# Patient Record
Sex: MALE | Race: WHITE
[De-identification: names, ages, dates, MRNs, and addresses within clinical notes are randomized per-mention and may not be internally consistent; named-entity substitution may affect disease eponyms.]

---

## 2019-03-26 ENCOUNTER — HOSPITAL ENCOUNTER (EMERGENCY)
Dept: HOSPITAL 94 - ER | Age: 24
LOS: 1 days | Discharge: PSYCHIATRIC OR SUBSTANCE USE FACILITY | End: 2019-03-27
Payer: COMMERCIAL

## 2019-03-26 VITALS — HEIGHT: 70 IN | BODY MASS INDEX: 23.67 KG/M2 | WEIGHT: 165.35 LBS
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
## 2019-03-26 DIAGNOSIS — Z56.0: ICD-10-CM

## 2019-03-26 DIAGNOSIS — T43.222A: ICD-10-CM

## 2019-03-26 DIAGNOSIS — T43.592A: ICD-10-CM

## 2019-03-26 DIAGNOSIS — F31.9: ICD-10-CM

## 2019-03-26 DIAGNOSIS — T14.91XA: Primary | ICD-10-CM

## 2019-03-26 LAB
ALBUMIN SERPL BCP-MCNC: 4.3 G/DL (ref 3.4–5)
ALBUMIN/GLOB SERPL: 1.1 {RATIO} (ref 1.1–1.5)
ALP SERPL-CCNC: 107 IU/L (ref 46–116)
ALT SERPL W P-5'-P-CCNC: 28 U/L (ref 12–78)
AMPHETAMINES UR QL SCN: NEGATIVE
ANION GAP SERPL CALCULATED.3IONS-SCNC: 10 MMOL/L (ref 8–16)
AST SERPL W P-5'-P-CCNC: 24 U/L (ref 10–37)
BACTERIA URNS QL MICRO: (no result) /HPF
BARBITURATES UR QL SCN: NEGATIVE
BASOPHILS # BLD AUTO: 0.1 X10'3 (ref 0–0.2)
BASOPHILS NFR BLD AUTO: 0.5 % (ref 0–1)
BENZODIAZ UR QL SCN: NEGATIVE
BILIRUB SERPL-MCNC: 1.1 MG/DL (ref 0.1–1)
BUN SERPL-MCNC: 22 MG/DL (ref 7–18)
BUN/CREAT SERPL: 19.8 (ref 5.4–32)
BZE UR QL SCN: NEGATIVE
CALCIUM SERPL-MCNC: 9.2 MG/DL (ref 8.5–10.1)
CANNABINOIDS UR QL SCN: POSITIVE
CHLORIDE SERPL-SCNC: 104 MMOL/L (ref 99–107)
CLARITY UR: CLEAR
CO2 SERPL-SCNC: 27.3 MMOL/L (ref 24–32)
COLOR UR: YELLOW
CREAT SERPL-MCNC: 1.11 MG/DL (ref 0.6–1.1)
DEPRECATED SQUAMOUS URNS QL MICRO: (no result) /LPF
EOSINOPHIL # BLD AUTO: 0.1 X10'3 (ref 0–0.9)
EOSINOPHIL NFR BLD AUTO: 0.7 % (ref 0–6)
ERYTHROCYTE [DISTWIDTH] IN BLOOD BY AUTOMATED COUNT: 14 % (ref 11.5–14.5)
ETHANOL SERPL-MCNC: < 0.01 GM/DL (ref 0–0.01)
GFR SERPL CREATININE-BSD FRML MDRD: 81 ML/MIN
GLUCOSE SERPL-MCNC: 69 MG/DL (ref 70–104)
GLUCOSE UR STRIP-MCNC: NEGATIVE MG/DL
HCT VFR BLD AUTO: 42.1 % (ref 42–52)
HGB BLD-MCNC: 14.2 G/DL (ref 14–17.9)
HGB UR QL STRIP: NEGATIVE
KETONES UR STRIP-MCNC: 40 MG/DL
LEUKOCYTE ESTERASE UR QL STRIP: NEGATIVE
LYMPHOCYTES # BLD AUTO: 1.7 X10'3 (ref 1.1–4.8)
LYMPHOCYTES NFR BLD AUTO: 16.7 % (ref 21–51)
MCH RBC QN AUTO: 29.6 PG (ref 27–31)
MCHC RBC AUTO-ENTMCNC: 33.6 G/DL (ref 33–36.5)
MCV RBC AUTO: 88 FL (ref 78–98)
METHADONE UR QL SCN: NEGATIVE
MONOCYTES # BLD AUTO: 0.6 X10'3 (ref 0–0.9)
MONOCYTES NFR BLD AUTO: 5.9 % (ref 2–12)
NEUTROPHILS # BLD AUTO: 7.6 X10'3 (ref 1.8–7.7)
NEUTROPHILS NFR BLD AUTO: 76.2 % (ref 42–75)
NITRITE UR QL STRIP: NEGATIVE
OPIATES UR QL SCN: NEGATIVE
PCP UR QL SCN: NEGATIVE
PH UR STRIP: 6 [PH] (ref 4.8–8)
PLATELET # BLD AUTO: 207 X10'3 (ref 140–440)
PMV BLD AUTO: 8.7 FL (ref 7.4–10.4)
POTASSIUM SERPL-SCNC: 4.2 MMOL/L (ref 3.5–5.1)
PROT SERPL-MCNC: 8.2 G/DL (ref 6.4–8.2)
PROT UR QL STRIP: (no result) MG/DL
RBC # BLD AUTO: 4.79 X10'6 (ref 4.7–6.1)
RBC #/AREA URNS HPF: (no result) /HPF (ref 0–2)
SODIUM SERPL-SCNC: 141 MMOL/L (ref 135–145)
SP GR UR STRIP: >=1.03 (ref 1–1.03)
TRANS CELLS URNS QL MICRO: (no result) /HPF
URN COLLECT METHOD CLASS: (no result)
UROBILINOGEN UR STRIP-MCNC: 0.2 E.U/DL (ref 0.2–1)
WBC # BLD AUTO: 9.9 X10'3 (ref 4.5–11)
WBC #/AREA URNS HPF: (no result) /HPF (ref 0–4)

## 2019-03-26 PROCEDURE — 85025 COMPLETE CBC W/AUTO DIFF WBC: CPT

## 2019-03-26 PROCEDURE — 99285 EMERGENCY DEPT VISIT HI MDM: CPT

## 2019-03-26 PROCEDURE — 81001 URINALYSIS AUTO W/SCOPE: CPT

## 2019-03-26 PROCEDURE — 80053 COMPREHEN METABOLIC PANEL: CPT

## 2019-03-26 PROCEDURE — 84443 ASSAY THYROID STIM HORMONE: CPT

## 2019-03-26 PROCEDURE — 80305 DRUG TEST PRSMV DIR OPT OBS: CPT

## 2019-03-26 PROCEDURE — 36415 COLL VENOUS BLD VENIPUNCTURE: CPT

## 2019-03-26 PROCEDURE — 80320 DRUG SCREEN QUANTALCOHOLS: CPT

## 2019-03-26 SDOH — ECONOMIC STABILITY - INCOME SECURITY: UNEMPLOYMENT, UNSPECIFIED: Z56.0

## 2019-03-26 NOTE — NUR
The patient was transferred to bed 22. He was made aware of unit routine and 
rules. He appears depressed and despondant. Stated he has been feeling paranoid 
that he is being watched and followed. He believes there are cameras in his 
home. He believes his food is being tampered with at times. Reports "seeing and 
hearing signs" Reports a voice saying "get out. go" He also reports getting 
messages from the TV. He reports a family history of schizophrenia in his 
paternal grandmother and his mother has bipolor disorder.

## 2019-03-26 NOTE — NUR
Discussed telepsych report with Jimmy CELESTIN and Zyprexa 5 mg po given to the 
patient. He is quiet and guarded but has been cooperative.

## 2019-03-27 ENCOUNTER — HOSPITAL ENCOUNTER (INPATIENT)
Dept: HOSPITAL 94 - ADULT MH | Age: 24
LOS: 3 days | Discharge: HOME | DRG: 885 | End: 2019-03-30
Attending: PSYCHIATRY & NEUROLOGY | Admitting: PSYCHIATRY & NEUROLOGY
Payer: SELF-PAY

## 2019-03-27 VITALS — BODY MASS INDEX: 22.19 KG/M2 | WEIGHT: 154.98 LBS | HEIGHT: 70 IN

## 2019-03-27 VITALS — SYSTOLIC BLOOD PRESSURE: 142 MMHG | DIASTOLIC BLOOD PRESSURE: 65 MMHG

## 2019-03-27 VITALS — DIASTOLIC BLOOD PRESSURE: 68 MMHG | SYSTOLIC BLOOD PRESSURE: 137 MMHG

## 2019-03-27 DIAGNOSIS — F29: ICD-10-CM

## 2019-03-27 DIAGNOSIS — Z79.899: ICD-10-CM

## 2019-03-27 DIAGNOSIS — G47.9: ICD-10-CM

## 2019-03-27 DIAGNOSIS — F25.9: ICD-10-CM
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
## 2019-03-27 DIAGNOSIS — T43.592A: ICD-10-CM

## 2019-03-27 DIAGNOSIS — F17.210: ICD-10-CM

## 2019-03-27 DIAGNOSIS — F33.2: Primary | ICD-10-CM

## 2019-03-27 DIAGNOSIS — T43.222A: ICD-10-CM

## 2019-03-27 DIAGNOSIS — F41.1: ICD-10-CM

## 2019-03-27 DIAGNOSIS — F12.19: ICD-10-CM

## 2019-03-27 DIAGNOSIS — F16.11: ICD-10-CM

## 2019-03-27 LAB
CHOLEST SERPL-MCNC: 167 MG/DL (ref 0–200)
CHOLEST/HDLC SERPL: 2.2 {RATIO} (ref 0–4.99)
HBA1C MFR BLD: 5.7 % (ref 4.5–6.2)
HDLC SERPL-MCNC: 76 MG/DL (ref 35–60)
LDLC SERPL DIRECT ASSAY-MCNC: 82 MG/DL (ref 50–100)
TRIGL SERPL-MCNC: 34 MG/DL (ref 20–135)

## 2019-03-27 PROCEDURE — 36415 COLL VENOUS BLD VENIPUNCTURE: CPT

## 2019-03-27 PROCEDURE — 87070 CULTURE OTHR SPECIMN AEROBIC: CPT

## 2019-03-27 PROCEDURE — 83036 HEMOGLOBIN GLYCOSYLATED A1C: CPT

## 2019-03-27 PROCEDURE — 80061 LIPID PANEL: CPT

## 2019-03-27 PROCEDURE — 84443 ASSAY THYROID STIM HORMONE: CPT

## 2019-03-27 RX ADMIN — NICOTINE SCH PATCH: 21 PATCH, EXTENDED RELEASE TRANSDERMAL at 18:36

## 2019-03-27 RX ADMIN — NICOTINE POLACRILEX PRN LOZ: 2 LOZENGE ORAL at 19:59

## 2019-03-27 NOTE — NUR
Admit Note: 1445



Pt presented to ER after ingesting 40-50 pills of sertraline and Vistaril depressed and 
quiet he was unable to contract for safety. History of bipolar and questionable 
schizophrenia. Pt has been seen by Dr. Cedeño in the community and was seen at [facility]. 



Pt brought to the unit upset expecting to be discharged and go home. Pt required security 
and therapeutic interventions for approximately an hour before agreeing to take oral 
medicine. 



Property inventoried with corbin counted by GERMÁN Dorman and GERMÁN Ladd. Pt. placed in rm #[id number] 
after taking a shower. Pt later apologized to staff for his behavior.

## 2019-03-28 VITALS — DIASTOLIC BLOOD PRESSURE: 59 MMHG | SYSTOLIC BLOOD PRESSURE: 119 MMHG

## 2019-03-28 VITALS — DIASTOLIC BLOOD PRESSURE: 86 MMHG | SYSTOLIC BLOOD PRESSURE: 140 MMHG

## 2019-03-28 RX ADMIN — NICOTINE SCH PATCH: 21 PATCH, EXTENDED RELEASE TRANSDERMAL at 08:27

## 2019-03-28 RX ADMIN — NICOTINE POLACRILEX PRN LOZ: 2 LOZENGE ORAL at 13:40

## 2019-03-28 RX ADMIN — NICOTINE POLACRILEX PRN LOZ: 2 LOZENGE ORAL at 21:11

## 2019-03-28 NOTE — NUR
RN Progress Note:



Legal hold: 5150



Client on involuntary status for DTS



Report received from TIFFANIE Barros with use of SBAR



Why are they here: Pt presented to ER after ingesting 40-50 pills of sertraline and Vistaril 
depressed and quiet he was unable to contract for safety. History of bipolar and 
questionable schizophrenia. Pt has been seen by Dr. Cedeño in the community and was seen at 
[facility]. 



Assessment



What happened this shift: 

The patient was asleep at change of shift. He got up for breakfast and was medication 
compliant. he attended all groups and was seen by Dr. Sweet.  The patient admits to feeling 
depressed but denies anxiety today. He states he is happy to be here and getting the help he 
needs.  He is having AH's however, not as "bad as in the past."  States he hears a sentence 
with some words louder than others. And now, at lunchtime he is hearing no voices at all. He 
states what is bothering him are "the cameras in my house, and that is a real big problem, I 
know they are real and there."  Reports he likes living with his sister as she "takes good 
care of me and helps me."  The patient in calm, cooperative and polite with staff today. He 
denies suicidal thoughts.



S/I, H/I: Denies

A/VH: AH's

Sleep: Napped

ADL's: Independent.

Group attendance: yes x 2

Were meds taken: Yes

Any med S/E: None noted 



Mental Status Exam

Appearance: Neat, clean

Eye contact: Good

Behavior: Interacted with staff and patients appropriately

Speech: Normal rate, volume, and rhythm

Mood: Depressed

Affect: Calm

Thought process: Linear

Thought Content: paranoid delusional (cameras in home)

Cognition: A/Ox 4

Insight: Poor

Judgment: Poor



Interventions

PRN's used: Norbert Nataly



Therapeutic interventions: 1:1 assessment with patient, provided active listening, 
maintained a safe and therapeutic environment to help establish rapport. Provided positive 
reinforcement. Medication administration and education. Maintained Q 15 minute checks for 
safety.

  

Justification of Continued Inpatient. Pt has poor judgment and is a danger to himself, he 
requires medication management and a therapeutic milieu to interrupt current crisis. Without 
intervention he is at risk for readmission.

## 2019-03-28 NOTE — NUR
RN Progress Note:



Legal hold: 5150



Client on involuntary status for competency.



Report received from TIFFANIE Ivy with use of SBAR



Why are they here: Pt presented to ER after ingesting 40-50 pills of sertraline and Vistaril 
depressed and quiet he was unable to contract for safety. History of bipolar and 
questionable schizophrenia. Pt has been seen by Dr. Cedeño in the community and was seen at 
[facility]. 



Assessment

Patient was up in the unit at the change of shift. He was witnessed interacting with other 
patients and watching TV. Met patient in his room for a 1:1 assessment. He denied current 
SI/HI. He stated he was calm and that he was upset earlier because he did not realize he was 
going to have to stay on the unit. He explained to staff that when he gets upset that his 
number one way to release agitation is physical activity. He expressed that he likes to work 
out and keep healthy. He was cooperative to finish his Crisis Intervention Plan and his MRSA 
swab. No violence or aggression noted this shift. He was able to approach staff this shift 
and request medications when he started getting anxious/agitated. 



S/I, H/I: Denies

A/VH: Denies

Sleep: Currently sleeping, see sleep assessment

ADL's: Independent.

Group attendance: None this shift

Were meds taken: Yes

Any med S/E: None noted 



Mental Status Exam

Appearance: Neat, clean

Eye contact: Good

Behavior: Interacted with staff and patients appropriately

Speech: Normal rate, volume, and rhythm

Mood: Calm

Affect: Congruent to mood

Thought process: Linear

Thought Content: Expressed his frustration, then explained he was okay now. 

Cognition: A/Ox 4

Insight: Fair

Judgment: Poor



Interventions

PRN's used: Ativan, Haldol, Benadryl



Therapeutic interventions: 1:1 assessment with patient, provided active listening, 
maintained a safe and therapeutic environment to help establish rapport. Provided positive 
reinforcement. Medication administration and education. Maintained Q 15 minute checks for 
safety.

  

Justification of Continued Inpatient. Pt has poor judgment and is a danger to himself, he 
requires medication management and a therapeutic milieu to interrupt current crisis. Without 
intervention he is at risk for readmission.

## 2019-03-28 NOTE — NUR
Malnutrition consult: Pt currently on a regular diet with documented PO intake 100% meeting 
nutrient needs. Unable to obtain wt hx d/t pt with no previous documented visits. Per 
physical assessment pt agitated and anxious. No H&P at this time. Pt with no documented 
edema or decrease in muscle strength. Pt currently does not meet criteria for malnutrition. 
Will continue to follow and monitor qualifying criteria for malnutrition.

-------------------------------------------------------------------------------

Addendum: 03/28/19 at 1017 by Marcela Diego RD

-------------------------------------------------------------------------------

Amended: Links added. Pt called EMS for [facility] states he has asthma.

## 2019-03-29 VITALS — DIASTOLIC BLOOD PRESSURE: 86 MMHG | SYSTOLIC BLOOD PRESSURE: 111 MMHG

## 2019-03-29 VITALS — DIASTOLIC BLOOD PRESSURE: 77 MMHG | SYSTOLIC BLOOD PRESSURE: 141 MMHG

## 2019-03-29 RX ADMIN — NICOTINE POLACRILEX PRN LOZ: 2 LOZENGE ORAL at 17:24

## 2019-03-29 RX ADMIN — VENLAFAXINE HYDROCHLORIDE SCH MG: 75 CAPSULE, EXTENDED RELEASE ORAL at 07:48

## 2019-03-29 RX ADMIN — NICOTINE SCH PATCH: 21 PATCH, EXTENDED RELEASE TRANSDERMAL at 07:48

## 2019-03-29 NOTE — NUR
Progress Note:



Legal hold: 5150



Client on involuntary status for DTS



Report received from TIFFANIE Barros with use of SBAR



Why are they here: Pt presented to ER after ingesting 40-50 pills of sertraline and Vistaril 
depressed and quiet he was unable to contract for safety. History of bipolar and 
questionable schizophrenia. Pt has been seen by Dr. Cedeño in the community and was seen at 
[facility]. 



Assessment



What happened this shift: 

The patient was asleep at change of shift. Reported sleeping well with a dose of Prazosin. 
Denies hallucinations, positive for ideas of reference (cameras in his home). Medication 
compliant and doing well on unit, interacts well with others, goes to all groups. Naps at 
times. Denies suicidal thoughts.  



S/I, H/I: Denies

A/VH: AH, denies hearing any this shift

Sleep: Naps

ADL's: Independent.

Group attendance: yes

Were meds taken: Yes

Any med S/E: None noted 



Mental Status Exam

Appearance: Neat, clean

Eye contact: Good

Behavior: Interacted with staff and patients appropriately

Speech: Normal rate, volume, and rhythm

Mood: Depressed

Affect: Calm

Thought process: Linear

Thought Content: medications and getting helped

Cognition: A/Ox 4

Insight: Poor

Judgment: Poor



Interventions

PRN's used: None



Therapeutic interventions: 1:1 assessment with patient, provided active listening, 
maintained a safe and therapeutic environment to help establish rapport. Provided positive 
reinforcement. Medication administration and education. Maintained Q 15 minute checks for 
safety.

  

Justification of Continued Inpatient. Pt has poor judgment and is a danger to himself, he 
requires medication management and a therapeutic milieu to interrupt current crisis. Without 
intervention he is at risk for readmission.

## 2019-03-29 NOTE — NUR
RN Progress Note:



Legal hold: 5150



Client on involuntary status for DTS



Report received from TIFFANIE Richmond with use of SBAR



Why are they here: Pt presented to ER after ingesting 40-50 pills of sertraline and Vistaril 
depressed and quiet he was unable to contract for safety. History of bipolar and 
questionable schizophrenia. Pt has been seen by Dr. Cedeño in the community and was seen at 
[facility]. 



Assessment



What happened this shift: Patient is up in the unit this evening enjoying unit milieu and 
interacting with other patients. He spent his time this evening in the group room playing 
card games with other patients. Hes cooperative for a 1:1 assessment at his bedside. He 
denies SI/HI, and states he is currently not having any AH. He does voice his concern about 
getting sleep, and requests that he get a sleeping medication to insure he gets rest this 
evening. Compliant with all evening medications. Turns to bed after taking HS meds. 



S/I, H/I: Denies

A/VH: AH, denies hearing any this shift

Sleep: Currently sleeping, see sleep assessment

ADL's: Independent.

Group attendance: No groups this shift

Were meds taken: Yes

Any med S/E: None noted 



Mental Status Exam

Appearance: Neat, clean

Eye contact: Good

Behavior: Interacted with staff and patients appropriately

Speech: Normal rate, volume, and rhythm

Mood: Depressed

Affect: Calm

Thought process: Linear

Thought Content: Worried about getting sleep, sleeping medications

Cognition: A/Ox 4

Insight: Poor

Judgment: Poor



Interventions

PRN's used: Nicotine Lozenge



Therapeutic interventions: 1:1 assessment with patient, provided active listening, 
maintained a safe and therapeutic environment to help establish rapport. Provided positive 
reinforcement. Medication administration and education. Maintained Q 15 minute checks for 
safety.

  

Justification of Continued Inpatient. Pt has poor judgment and is a danger to himself, he 
requires medication management and a therapeutic milieu to interrupt current crisis. Without 
intervention he is at risk for readmission.

## 2019-03-30 VITALS — SYSTOLIC BLOOD PRESSURE: 134 MMHG | DIASTOLIC BLOOD PRESSURE: 70 MMHG

## 2019-03-30 VITALS — SYSTOLIC BLOOD PRESSURE: 171 MMHG | DIASTOLIC BLOOD PRESSURE: 103 MMHG

## 2019-03-30 RX ADMIN — NICOTINE POLACRILEX PRN LOZ: 2 LOZENGE ORAL at 13:21

## 2019-03-30 RX ADMIN — NICOTINE SCH PATCH: 21 PATCH, EXTENDED RELEASE TRANSDERMAL at 07:48

## 2019-03-30 RX ADMIN — VENLAFAXINE HYDROCHLORIDE SCH MG: 75 CAPSULE, EXTENDED RELEASE ORAL at 07:49

## 2019-03-30 NOTE — NUR
Progress Note:



Legal hold: 5150



Client on involuntary status for DTS



Report received from TIFFANIE Richmond with use of SBAR



Why are they here: Pt presented to ER after ingesting 40-50 pills of sertraline and Vistaril 
depressed and quiet he was unable to contract for safety. History of bipolar and 
questionable schizophrenia. Pt has been seen by Dr. Cedeño in the community and was seen at 
[facility]. 



Assessment



What happened this shift: 

Patient is laying in bed at the johnson of shift. His mother comes and visits him this evening 
and stays for all of visiting hours. Met with patient for a 1:1 assessment. He says his 
visit is good and that him and his Mom have an "okay" relationship "but we are working on 
it." He denies SI/HI, VH/AH and makes no paranoid or delusional statements this shift. He is 
noted to interact well with other patients. He takes all medications as ordered. 



S/I, H/I: Denies

A/VH: AH, denies hearing any this shift

Sleep: Currently sleeping, see sleep assessment

ADL's: Independent.

Group attendance: Yes

Were meds taken: Yes

Any med S/E: None noted 



Mental Status Exam

Appearance: Neat, clean

Eye contact: Good

Behavior: Interacted with staff and patients appropriately

Speech: Normal rate, volume, and rhythm

Mood: Depressed

Affect: Calm

Thought process: Linear

Thought Content: Medications and getting helped

Cognition: A/Ox 4

Insight: Poor

Judgment: Poor



Interventions

PRN's used: None



Therapeutic interventions: 1:1 assessment with patient, provided active listening, 
maintained a safe and therapeutic environment to help establish rapport. Provided positive 
reinforcement. Monitored for self harm risk. Medication administration and education. 
Maintained Q 15 minute checks for safety.

  

Justification of Continued Inpatient. Pt has poor judgment and is a danger to himself, he 
requires medication management and a therapeutic milieu to interrupt current crisis. Without 
intervention he is at risk for readmission.

## 2019-03-30 NOTE — NUR
DC NOTE:



The patient exited the unit at 2005. He ambulated, accompanied by VIC Lama. The patient 
stated he would walk home because he lives real [place] on [facility]. He had all his 
valuables that were inventoried, and his mood and affect were bright. There was no emotional 
or physical distress, and the patient believes all he had to do was get off marijuana. He 
has a follow up appointment at [facility].  He did not want to quit smoking.

## 2019-03-30 NOTE — NUR
NURSING PROGRESS NOTE:



Legal hold: None



Client on Voluntary status



Report received from TIFFANIE Barros with use of SBAR



Why are they here: Pt presented to ER after ingesting 40-50 pills of sertraline and Vistaril 
depressed and quiet he was unable to contract for safety. History of bipolar and 
questionable schizophrenia. Pt has been seen by Dr. Cedeño in the community and was seen at 
[facility]. 



Assessment



What happened this shift: 

The patient was asleep at change of shift. Reported sleeping well. Up to all meals and 
groups with others. Participating well in groups and interacting with peers. Eating well. 
Denies suicidal thoughts and denies having any paranoid thoughts about cameras in his home. 
States, "I think some of those paranoid thoughts were coming from the pot use." Patient 
stated his head feels clear, and the group work is challenging him to "see himself."  No 
anxiety noted and went outside with others 3 times today. Mood is upbeat. Has spent time 
reading today.



S/I, H/I: Denies

A/VH: AH, Denies

Sleep: None

ADL's: Independent.

Group attendance: yes

Were meds taken: Yes

Any med S/E: None noted 



Mental Status Exam



Appearance: Neat, clean

Eye contact: Good

Behavior: Interacted with staff and patients appropriately

Speech: Normal rate, volume, and rhythm

Mood: Positive

Affect: Calm

Thought process: Linear

Thought Content: medications and getting helped

Cognition: A/Ox 4

Insight: Good

Judgment: Good



Interventions

PRN's used: None



Therapeutic interventions: 1:1 assessment with patient, provided active listening, 
maintained a safe and therapeutic environment to help establish rapport. Provided positive 
reinforcement. Medication administration and education. Maintained Q 15 minute checks for 
safety.

  

Justification of Continued Inpatient. Pt has poor judgment and is a danger to himself, he 
requires medication management and a therapeutic milieu to interrupt current crisis. Without 
intervention he is at risk for readmission.

## 2019-05-30 ENCOUNTER — HOSPITAL ENCOUNTER (EMERGENCY)
Dept: HOSPITAL 94 - ER | Age: 24
LOS: 3 days | Discharge: PSYCHIATRIC OR SUBSTANCE USE FACILITY | End: 2019-06-02
Payer: COMMERCIAL

## 2019-05-30 VITALS — WEIGHT: 116.51 LBS | HEIGHT: 70 IN | BODY MASS INDEX: 16.68 KG/M2

## 2019-05-30 DIAGNOSIS — R45.851: ICD-10-CM

## 2019-05-30 DIAGNOSIS — F31.9: ICD-10-CM

## 2019-05-30 DIAGNOSIS — Z56.0: ICD-10-CM

## 2019-05-30 DIAGNOSIS — Z79.899: ICD-10-CM

## 2019-05-30 DIAGNOSIS — F20.9: Primary | ICD-10-CM

## 2019-05-30 LAB
ALBUMIN SERPL BCP-MCNC: 4.4 G/DL (ref 3.4–5)
ALBUMIN/GLOB SERPL: 1.2 {RATIO} (ref 1.1–1.5)
ALP SERPL-CCNC: 110 IU/L (ref 46–116)
ALT SERPL W P-5'-P-CCNC: 29 U/L (ref 12–78)
ANION GAP SERPL CALCULATED.3IONS-SCNC: 10 MMOL/L (ref 8–16)
AST SERPL W P-5'-P-CCNC: 24 U/L (ref 10–37)
BASOPHILS # BLD AUTO: 0.1 X10'3 (ref 0–0.2)
BASOPHILS NFR BLD AUTO: 0.7 % (ref 0–1)
BILIRUB SERPL-MCNC: 0.9 MG/DL (ref 0.1–1)
BUN SERPL-MCNC: 15 MG/DL (ref 7–18)
BUN/CREAT SERPL: 14.7 (ref 5.4–32)
CALCIUM SERPL-MCNC: 8.8 MG/DL (ref 8.5–10.1)
CHLORIDE SERPL-SCNC: 103 MMOL/L (ref 99–107)
CO2 SERPL-SCNC: 26.6 MMOL/L (ref 24–32)
CREAT SERPL-MCNC: 1.02 MG/DL (ref 0.6–1.1)
EOSINOPHIL # BLD AUTO: 0.3 X10'3 (ref 0–0.9)
EOSINOPHIL NFR BLD AUTO: 2.9 % (ref 0–6)
ERYTHROCYTE [DISTWIDTH] IN BLOOD BY AUTOMATED COUNT: 13.1 % (ref 11.5–14.5)
ETHANOL SERPL-MCNC: < 0.01 GM/DL (ref 0–0.01)
GFR SERPL CREATININE-BSD FRML MDRD: 90 ML/MIN
GLUCOSE SERPL-MCNC: 93 MG/DL (ref 70–104)
HCT VFR BLD AUTO: 41.9 % (ref 42–52)
HGB BLD-MCNC: 14.3 G/DL (ref 14–17.9)
LYMPHOCYTES # BLD AUTO: 2.2 X10'3 (ref 1.1–4.8)
LYMPHOCYTES NFR BLD AUTO: 22.3 % (ref 21–51)
MCH RBC QN AUTO: 30.6 PG (ref 27–31)
MCHC RBC AUTO-ENTMCNC: 34.1 G/DL (ref 33–36.5)
MCV RBC AUTO: 89.8 FL (ref 78–98)
MONOCYTES # BLD AUTO: 0.6 X10'3 (ref 0–0.9)
MONOCYTES NFR BLD AUTO: 6.1 % (ref 2–12)
NEUTROPHILS # BLD AUTO: 6.7 X10'3 (ref 1.8–7.7)
NEUTROPHILS NFR BLD AUTO: 68 % (ref 42–75)
PLATELET # BLD AUTO: 201 X10'3 (ref 140–440)
PMV BLD AUTO: 9.2 FL (ref 7.4–10.4)
POTASSIUM SERPL-SCNC: 3.2 MMOL/L (ref 3.5–5.1)
PROT SERPL-MCNC: 8.1 G/DL (ref 6.4–8.2)
RBC # BLD AUTO: 4.67 X10'6 (ref 4.7–6.1)
SODIUM SERPL-SCNC: 140 MMOL/L (ref 135–145)
WBC # BLD AUTO: 9.9 X10'3 (ref 4.5–11)

## 2019-05-30 PROCEDURE — 80305 DRUG TEST PRSMV DIR OPT OBS: CPT

## 2019-05-30 PROCEDURE — 99285 EMERGENCY DEPT VISIT HI MDM: CPT

## 2019-05-30 PROCEDURE — 84443 ASSAY THYROID STIM HORMONE: CPT

## 2019-05-30 PROCEDURE — 85025 COMPLETE CBC W/AUTO DIFF WBC: CPT

## 2019-05-30 PROCEDURE — 80053 COMPREHEN METABOLIC PANEL: CPT

## 2019-05-30 PROCEDURE — 81001 URINALYSIS AUTO W/SCOPE: CPT

## 2019-05-30 PROCEDURE — 87088 URINE BACTERIA CULTURE: CPT

## 2019-05-30 PROCEDURE — 80320 DRUG SCREEN QUANTALCOHOLS: CPT

## 2019-05-30 PROCEDURE — 36415 COLL VENOUS BLD VENIPUNCTURE: CPT

## 2019-05-30 SDOH — ECONOMIC STABILITY - INCOME SECURITY: UNEMPLOYMENT, UNSPECIFIED: Z56.0

## 2019-05-30 NOTE — NUR
Pt was BIB sister. She reports that Jad has been off of his medications and 
has a history of bipolar, schizophrenia, and depression. On assessment pt 
states he is currently hearing voices that tell him to harm himself, but he 
will not give specifics. When asked if he is suicidal he states, "always" but 
will not answer anything further. He is quiet and cooperative, and lays still 
with his eyes closed.

## 2019-05-30 NOTE — NUR
Unable to obtain a current medication list from patient or his sister. Will 
call his pharmacy in the AM.

## 2019-05-31 LAB
AMPHETAMINES UR QL SCN: NEGATIVE
BARBITURATES UR QL SCN: NEGATIVE
BENZODIAZ UR QL SCN: NEGATIVE
BZE UR QL SCN: NEGATIVE
CANNABINOIDS UR QL SCN: POSITIVE
METHADONE UR QL SCN: NEGATIVE
OPIATES UR QL SCN: NEGATIVE
PCP UR QL SCN: NEGATIVE

## 2019-05-31 NOTE — NUR
pT STATES HE DOES NOT TAKE ANY HOME MEDS.  tHE LAST TIME HE TOOK MEDS WAS WHEN 
HE WAS HERE AT [facility] AS AN INPATIENT.

-------------------------------------------------------------------------------

Addendum: 05/31/19 at 0902 by [email or web address]

-------------------------------------------------------------------------------

WILL CHECK IF ABLE TO GET MED REC FROM [facility].

## 2019-05-31 NOTE — NUR
Pt requested food, a sandwhich, water, and juice were given to him, which he 
ate and drank. Pt went to sleep shortly after eating.

## 2019-05-31 NOTE — NUR
PATIENT IS SMILING AND STATES THAT HE "FEELS FINE NOW, BUT REALLY DOWN 
SOMETIMES""YULIANA BEEN OFF MY MEDS SINCE I WAS LAST HERE"

PATIENT APPROPRIATE, POLITE AMBULATES WNL

## 2019-05-31 NOTE — NUR
Pt awake after vitals being taken. Pt encouraged to try to urinate in specimen 
cup, pt verbalized understanding and is currently drinking water pitcher.

## 2019-06-01 LAB
BACTERIA URNS QL MICRO: (no result) /HPF
CLARITY UR: CLEAR
COLOR UR: YELLOW
DEPRECATED SQUAMOUS URNS QL MICRO: (no result) /LPF
GLUCOSE UR STRIP-MCNC: NEGATIVE MG/DL
HGB UR QL STRIP: NEGATIVE
KETONES UR STRIP-MCNC: NEGATIVE MG/DL
LEUKOCYTE ESTERASE UR QL STRIP: (no result)
MUCOUS THREADS URNS QL MICRO: (no result) /LPF
NITRITE UR QL STRIP: NEGATIVE
PH UR STRIP: 6 [PH] (ref 4.8–8)
PROT UR QL STRIP: NEGATIVE MG/DL
RBC #/AREA URNS HPF: (no result) /HPF (ref 0–2)
SP GR UR STRIP: 1.01 (ref 1–1.03)
URN COLLECT METHOD CLASS: (no result)
UROBILINOGEN UR STRIP-MCNC: 0.2 E.U/DL (ref 0.2–1)
WBC #/AREA URNS HPF: (no result) /HPF (ref 0–4)

## 2019-06-01 NOTE — NUR
The patient is pacing back and forth directly if front of the nursing station. 
He stated that is helping him relax. One to one with the patient to assess for 
self harm risk and severity of his thought disorder. The patient denies that he 
is hearing voices but stated "It's just me responding to my enviroment" He 
believes that staff can read his mind and people giving him responses to what 
he is thinking. He denies that he has a mental illness and explained, "It's 
more of a mind thing" that people can read his mind through wave lengths and 
frequencies" He stated that is having racing thoughts "all the time" He denies 
being suicidal at this point but also admits to mood lability.

## 2019-06-01 NOTE — NUR
The patient is resting on his bed. At this time he is refusing his HS 
resperidal but stated that he might reconsider. Patient encouraged to take his 
medications.

## 2019-06-01 NOTE — NUR
Assumed care of client.  Client is asleep on back in no apparent distress.  
Respirations are even and unlabored.

## 2019-06-01 NOTE — NUR
[facility] called and got report on client.  They asked for medical 
clearance to be faxed and urinalysis to be completed.  Order placed for 
urinalysis and waiting on sample.

## 2019-06-01 NOTE — NUR
Mk Lopez has accepted the patient for a 9am  for tomorrow per 
[place] office of [facility]

## 2019-06-02 VITALS — DIASTOLIC BLOOD PRESSURE: 74 MMHG | SYSTOLIC BLOOD PRESSURE: 118 MMHG

## 2019-06-02 NOTE — NUR
Patient out of bed ambulated independently to restroom, tolerated well. Patient 
returned to bed for breakfast.

## 2019-06-02 NOTE — NUR
Assessment will not save to patients chart, reviewed previous nurses findings 
and agree with assessment.

## 2019-06-02 NOTE — NUR
Patient walked out with security and [facility] . New clothes obtained for 
patient. Patient took all belongings in patient belongings bag. Patient 
cooperative and calm while leaving facility.

## 2019-06-02 NOTE — NUR
Patient unhappy that he is going to [facility]. He states that it didn't help 
last time and it won't help this time. Security at the bedside. Patient is calm 
but not happy. Will continue to monitor.

## 2023-06-14 NOTE — NUR
Patient refused K-Dur, states "I ate my banana from on my tray, I don't need 
it." Patient is up walking the room. No issues currently. No

## 2025-07-17 NOTE — NUR
Last OV 9/17/24  Follow up 9/3/25   PATIENT APPEARS TO BE SLEEPING: RR EVEN UNLABORED LAYING ON RIGHT SIDE